# Patient Record
Sex: FEMALE | Race: BLACK OR AFRICAN AMERICAN | NOT HISPANIC OR LATINO | URBAN - METROPOLITAN AREA
[De-identification: names, ages, dates, MRNs, and addresses within clinical notes are randomized per-mention and may not be internally consistent; named-entity substitution may affect disease eponyms.]

---

## 2017-06-08 ENCOUNTER — INPATIENT (INPATIENT)
Facility: HOSPITAL | Age: 37
LOS: 1 days | Discharge: HOME | End: 2017-06-10
Attending: SPECIALIST | Admitting: SPECIALIST
Payer: COMMERCIAL

## 2017-06-08 VITALS — WEIGHT: 189.6 LBS | HEIGHT: 65 IN

## 2017-06-08 DIAGNOSIS — O26.899 OTHER SPECIFIED PREGNANCY RELATED CONDITIONS, UNSPECIFIED TRIMESTER: ICD-10-CM

## 2017-06-08 DIAGNOSIS — Z3A.00 WEEKS OF GESTATION OF PREGNANCY NOT SPECIFIED: ICD-10-CM

## 2017-06-08 LAB
BASOPHILS NFR BLD AUTO: 0.2 % — SIGNIFICANT CHANGE UP (ref 0–2)
BLD GP AB SCN SERPL QL: NEGATIVE — SIGNIFICANT CHANGE UP
EOSINOPHIL NFR BLD AUTO: 1.6 % — SIGNIFICANT CHANGE UP (ref 0–6)
HCT VFR BLD CALC: 39.8 % — SIGNIFICANT CHANGE UP (ref 34.5–45)
HGB BLD-MCNC: 14 G/DL — SIGNIFICANT CHANGE UP (ref 11.5–15.5)
LYMPHOCYTES # BLD AUTO: 24.6 % — SIGNIFICANT CHANGE UP (ref 13–44)
MCHC RBC-ENTMCNC: 30.6 PG — SIGNIFICANT CHANGE UP (ref 27–34)
MCHC RBC-ENTMCNC: 35.2 G/DL — SIGNIFICANT CHANGE UP (ref 32–36)
MCV RBC AUTO: 87.1 FL — SIGNIFICANT CHANGE UP (ref 80–100)
MONOCYTES NFR BLD AUTO: 7.7 % — SIGNIFICANT CHANGE UP (ref 2–14)
NEUTROPHILS NFR BLD AUTO: 65.9 % — SIGNIFICANT CHANGE UP (ref 43–77)
PLATELET # BLD AUTO: 188 K/UL — SIGNIFICANT CHANGE UP (ref 150–400)
RBC # BLD: 4.57 M/UL — SIGNIFICANT CHANGE UP (ref 3.8–5.2)
RBC # FLD: 14.1 % — SIGNIFICANT CHANGE UP (ref 10.3–16.9)
RH IG SCN BLD-IMP: POSITIVE — SIGNIFICANT CHANGE UP
RH IG SCN BLD-IMP: POSITIVE — SIGNIFICANT CHANGE UP
T PALLIDUM AB TITR SER: NEGATIVE — SIGNIFICANT CHANGE UP
WBC # BLD: 4.9 K/UL — SIGNIFICANT CHANGE UP (ref 3.8–10.5)
WBC # FLD AUTO: 4.9 K/UL — SIGNIFICANT CHANGE UP (ref 3.8–10.5)

## 2017-06-08 RX ORDER — ACETAMINOPHEN 500 MG
650 TABLET ORAL EVERY 6 HOURS
Qty: 0 | Refills: 0 | Status: DISCONTINUED | OUTPATIENT
Start: 2017-06-08 | End: 2017-06-10

## 2017-06-08 RX ORDER — SIMETHICONE 80 MG/1
80 TABLET, CHEWABLE ORAL EVERY 6 HOURS
Qty: 0 | Refills: 0 | Status: DISCONTINUED | OUTPATIENT
Start: 2017-06-08 | End: 2017-06-10

## 2017-06-08 RX ORDER — PRAMOXINE HYDROCHLORIDE 150 MG/15G
1 AEROSOL, FOAM RECTAL EVERY 4 HOURS
Qty: 0 | Refills: 0 | Status: DISCONTINUED | OUTPATIENT
Start: 2017-06-08 | End: 2017-06-10

## 2017-06-08 RX ORDER — SODIUM CHLORIDE 9 MG/ML
1000 INJECTION, SOLUTION INTRAVENOUS
Qty: 0 | Refills: 0 | Status: DISCONTINUED | OUTPATIENT
Start: 2017-06-08 | End: 2017-06-08

## 2017-06-08 RX ORDER — MAGNESIUM HYDROXIDE 400 MG/1
30 TABLET, CHEWABLE ORAL
Qty: 0 | Refills: 0 | Status: DISCONTINUED | OUTPATIENT
Start: 2017-06-08 | End: 2017-06-10

## 2017-06-08 RX ORDER — TETANUS TOXOID, REDUCED DIPHTHERIA TOXOID AND ACELLULAR PERTUSSIS VACCINE, ADSORBED 5; 2.5; 8; 8; 2.5 [IU]/.5ML; [IU]/.5ML; UG/.5ML; UG/.5ML; UG/.5ML
0.5 SUSPENSION INTRAMUSCULAR ONCE
Qty: 0 | Refills: 0 | Status: DISCONTINUED | OUTPATIENT
Start: 2017-06-08 | End: 2017-06-10

## 2017-06-08 RX ORDER — DOCUSATE SODIUM 100 MG
100 CAPSULE ORAL
Qty: 0 | Refills: 0 | Status: DISCONTINUED | OUTPATIENT
Start: 2017-06-08 | End: 2017-06-10

## 2017-06-08 RX ORDER — OXYTOCIN 10 UNIT/ML
41.67 VIAL (ML) INJECTION
Qty: 20 | Refills: 0 | Status: DISCONTINUED | OUTPATIENT
Start: 2017-06-08 | End: 2017-06-10

## 2017-06-08 RX ORDER — IBUPROFEN 200 MG
600 TABLET ORAL EVERY 6 HOURS
Qty: 0 | Refills: 0 | Status: DISCONTINUED | OUTPATIENT
Start: 2017-06-08 | End: 2017-06-10

## 2017-06-08 RX ORDER — CITRIC ACID/SODIUM CITRATE 300-500 MG
15 SOLUTION, ORAL ORAL EVERY 4 HOURS
Qty: 0 | Refills: 0 | Status: DISCONTINUED | OUTPATIENT
Start: 2017-06-08 | End: 2017-06-08

## 2017-06-08 RX ORDER — HYDROCORTISONE 1 %
1 OINTMENT (GRAM) TOPICAL EVERY 4 HOURS
Qty: 0 | Refills: 0 | Status: DISCONTINUED | OUTPATIENT
Start: 2017-06-08 | End: 2017-06-10

## 2017-06-08 RX ORDER — SODIUM CHLORIDE 9 MG/ML
3 INJECTION INTRAMUSCULAR; INTRAVENOUS; SUBCUTANEOUS EVERY 8 HOURS
Qty: 0 | Refills: 0 | Status: DISCONTINUED | OUTPATIENT
Start: 2017-06-08 | End: 2017-06-10

## 2017-06-08 RX ORDER — DIBUCAINE 1 %
1 OINTMENT (GRAM) RECTAL EVERY 4 HOURS
Qty: 0 | Refills: 0 | Status: DISCONTINUED | OUTPATIENT
Start: 2017-06-08 | End: 2017-06-10

## 2017-06-08 RX ORDER — SODIUM CHLORIDE 9 MG/ML
1000 INJECTION, SOLUTION INTRAVENOUS ONCE
Qty: 0 | Refills: 0 | Status: COMPLETED | OUTPATIENT
Start: 2017-06-08 | End: 2017-06-08

## 2017-06-08 RX ORDER — LANOLIN
1 OINTMENT (GRAM) TOPICAL EVERY 6 HOURS
Qty: 0 | Refills: 0 | Status: DISCONTINUED | OUTPATIENT
Start: 2017-06-08 | End: 2017-06-10

## 2017-06-08 RX ORDER — DIPHENHYDRAMINE HCL 50 MG
25 CAPSULE ORAL EVERY 6 HOURS
Qty: 0 | Refills: 0 | Status: DISCONTINUED | OUTPATIENT
Start: 2017-06-08 | End: 2017-06-10

## 2017-06-08 RX ORDER — OXYTOCIN 10 UNIT/ML
333.33 VIAL (ML) INJECTION
Qty: 20 | Refills: 0 | Status: DISCONTINUED | OUTPATIENT
Start: 2017-06-08 | End: 2017-06-08

## 2017-06-08 RX ORDER — AER TRAVELER 0.5 G/1
1 SOLUTION RECTAL; TOPICAL EVERY 4 HOURS
Qty: 0 | Refills: 0 | Status: DISCONTINUED | OUTPATIENT
Start: 2017-06-08 | End: 2017-06-10

## 2017-06-08 RX ORDER — GLYCERIN ADULT
1 SUPPOSITORY, RECTAL RECTAL AT BEDTIME
Qty: 0 | Refills: 0 | Status: DISCONTINUED | OUTPATIENT
Start: 2017-06-08 | End: 2017-06-10

## 2017-06-08 RX ADMIN — SODIUM CHLORIDE 125 MILLILITER(S): 9 INJECTION, SOLUTION INTRAVENOUS at 12:13

## 2017-06-08 RX ADMIN — SODIUM CHLORIDE 2000 MILLILITER(S): 9 INJECTION, SOLUTION INTRAVENOUS at 12:12

## 2017-06-09 RX ADMIN — Medication 1 APPLICATION(S): at 21:53

## 2017-06-09 RX ADMIN — Medication 1 TABLET(S): at 09:07

## 2017-06-09 RX ADMIN — Medication 600 MILLIGRAM(S): at 16:20

## 2017-06-09 RX ADMIN — SODIUM CHLORIDE 3 MILLILITER(S): 9 INJECTION INTRAMUSCULAR; INTRAVENOUS; SUBCUTANEOUS at 05:16

## 2017-06-09 RX ADMIN — Medication 600 MILLIGRAM(S): at 09:08

## 2017-06-09 RX ADMIN — Medication 600 MILLIGRAM(S): at 09:40

## 2017-06-09 RX ADMIN — Medication 100 MILLIGRAM(S): at 21:53

## 2017-06-09 RX ADMIN — SODIUM CHLORIDE 3 MILLILITER(S): 9 INJECTION INTRAMUSCULAR; INTRAVENOUS; SUBCUTANEOUS at 02:51

## 2017-06-09 RX ADMIN — Medication 600 MILLIGRAM(S): at 21:53

## 2017-06-09 RX ADMIN — Medication 600 MILLIGRAM(S): at 15:45

## 2017-06-09 RX ADMIN — SODIUM CHLORIDE 3 MILLILITER(S): 9 INJECTION INTRAMUSCULAR; INTRAVENOUS; SUBCUTANEOUS at 15:45

## 2017-06-09 NOTE — PROGRESS NOTE ADULT - SUBJECTIVE AND OBJECTIVE BOX
Patient evaluated at bedside.   She reports pain is well controlled.  She has been ambulating without assistance, voiding spontaneously, and is breastfeeding.  She has no concerns at this time.    Physical Exam:  T(C): 36.8, Max: 36.8 (06-09 @ 05:42)  HR: 91 (84 - 91)  BP: 108/72 (108/72 - 121/57)  RR: 18 (18 - 18)  SpO2: 97% (97% - 98%)  Wt(kg): --    GA: NAD, A+0 x 3  Abd: soft, nontender, nondistended, no rebound or guarding, uterus firm at midline, below the umbilicus  : lochia WNL  Extremities: no calf tenderness                            14.0   4.9   )-----------( 188      ( 08 Jun 2017 11:34 )             39.8

## 2017-06-09 NOTE — PROGRESS NOTE ADULT - ASSESSMENT
A/P 37y PPD#1 s/p , stable  1. Pain: well controlled on OPM  2. GI: Regular diet  3. : voiding  4. DVT prophylaxis: ambulate  5. Dispo: PPD 2, unless otherwise specified

## 2017-06-10 VITALS
SYSTOLIC BLOOD PRESSURE: 117 MMHG | TEMPERATURE: 98 F | DIASTOLIC BLOOD PRESSURE: 73 MMHG | OXYGEN SATURATION: 99 % | RESPIRATION RATE: 18 BRPM | HEART RATE: 102 BPM

## 2017-06-10 PROCEDURE — 86900 BLOOD TYPING SEROLOGIC ABO: CPT

## 2017-06-10 PROCEDURE — 86850 RBC ANTIBODY SCREEN: CPT

## 2017-06-10 PROCEDURE — 85025 COMPLETE CBC W/AUTO DIFF WBC: CPT

## 2017-06-10 PROCEDURE — 86901 BLOOD TYPING SEROLOGIC RH(D): CPT

## 2017-06-10 PROCEDURE — 99214 OFFICE O/P EST MOD 30 MIN: CPT

## 2017-06-10 PROCEDURE — 86780 TREPONEMA PALLIDUM: CPT

## 2017-06-10 RX ORDER — VALACYCLOVIR 500 MG/1
1 TABLET, FILM COATED ORAL
Qty: 0 | Refills: 0 | COMMUNITY

## 2017-06-10 RX ADMIN — Medication 600 MILLIGRAM(S): at 05:02

## 2017-06-10 RX ADMIN — Medication 600 MILLIGRAM(S): at 11:08

## 2017-06-10 RX ADMIN — Medication 600 MILLIGRAM(S): at 04:02

## 2017-06-10 RX ADMIN — Medication 600 MILLIGRAM(S): at 10:09

## 2017-06-10 NOTE — PROGRESS NOTE ADULT - SUBJECTIVE AND OBJECTIVE BOX
Patient evaluated at bedside.   She reports pain is well controlled.  She has been ambulating without assistance, voiding spontaneously, and is breastfeeding.  She has no concerns at this time.    Physical Exam:  Vital Signs Last 24 Hrs  T(C): 36.9, Max: 36.9 (06-09 @ 21:00)  T(F): 98.5, Max: 98.5 (06-09 @ 21:00)  HR: 85 (77 - 85)  BP: 110/69 (106/70 - 110/69)  BP(mean): --  RR: 18 (18 - 18)  SpO2: 97% (97% - 98%)    GA: NAD, A+0 x 3  Abd: soft, nontender, nondistended, no rebound or guarding, uterus firm at midline, below the umbilicus  : lochia WNL  Extremities: no calf tenderness                            14.0   4.9   )-----------( 188      ( 08 Jun 2017 11:34 )             39.8

## 2017-06-10 NOTE — DISCHARGE NOTE OB - CARE PROVIDER_API CALL
Aniket Lagunas), Obstetrics and Gynecology  [address] [place] [address]  Phone: [phone]  Fax: [phone]

## 2017-06-10 NOTE — DISCHARGE NOTE OB - CARE PLAN
Principal Discharge DX:	Postpartum normal course  Goal:	healthy recovery!  Instructions for follow-up, activity and diet:	no sex 6 weeks. no baths 6 weeks. shower is ok. return to the hospital for fever >101, abdominal pain, heavy vaginal bleeding.

## 2017-06-10 NOTE — DISCHARGE NOTE OB - PATIENT PORTAL LINK FT
“You can access the [facility] Patient Portal, offered by [facility], by registering with the following website: [email or web address]”

## 2017-06-10 NOTE — DISCHARGE NOTE OB - PLAN OF CARE
healthy recovery! no sex 6 weeks. no baths 6 weeks. shower is ok. return to the hospital for fever >101, abdominal pain, heavy vaginal bleeding.

## 2017-06-13 DIAGNOSIS — Z34.83 ENCOUNTER FOR SUPERVISION OF OTHER NORMAL PREGNANCY, THIRD TRIMESTER: ICD-10-CM

## 2017-06-13 DIAGNOSIS — O34.13 MATERNAL CARE FOR BENIGN TUMOR OF CORPUS UTERI, THIRD TRIMESTER: ICD-10-CM

## 2017-06-13 DIAGNOSIS — O26.899 OTHER SPECIFIED PREGNANCY RELATED CONDITIONS, UNSPECIFIED TRIMESTER: ICD-10-CM

## 2017-06-13 DIAGNOSIS — D25.9 LEIOMYOMA OF UTERUS, UNSPECIFIED: ICD-10-CM

## 2017-06-13 DIAGNOSIS — O09.523 SUPERVISION OF ELDERLY MULTIGRAVIDA, THIRD TRIMESTER: ICD-10-CM

## 2017-06-13 DIAGNOSIS — Z3A.00 WEEKS OF GESTATION OF PREGNANCY NOT SPECIFIED: ICD-10-CM

## 2017-06-13 DIAGNOSIS — Z3A.39 39 WEEKS GESTATION OF PREGNANCY: ICD-10-CM

## 2018-12-11 PROBLEM — Z00.00 ENCOUNTER FOR PREVENTIVE HEALTH EXAMINATION: Status: ACTIVE | Noted: 2018-12-11

## 2019-01-18 ENCOUNTER — OUTPATIENT (OUTPATIENT)
Dept: OUTPATIENT SERVICES | Facility: HOSPITAL | Age: 39
LOS: 1 days | End: 2019-01-18
Payer: COMMERCIAL

## 2019-01-18 ENCOUNTER — APPOINTMENT (OUTPATIENT)
Dept: ULTRASOUND IMAGING | Facility: HOSPITAL | Age: 39
End: 2019-01-18
Payer: COMMERCIAL

## 2019-01-18 ENCOUNTER — APPOINTMENT (OUTPATIENT)
Dept: MAMMOGRAPHY | Facility: HOSPITAL | Age: 39
End: 2019-01-18
Payer: COMMERCIAL

## 2019-01-18 PROCEDURE — 76856 US EXAM PELVIC COMPLETE: CPT | Mod: 26

## 2019-01-18 PROCEDURE — 77067 SCR MAMMO BI INCL CAD: CPT | Mod: 26

## 2019-01-18 PROCEDURE — 77067 SCR MAMMO BI INCL CAD: CPT

## 2019-01-18 PROCEDURE — 76856 US EXAM PELVIC COMPLETE: CPT

## 2019-01-18 PROCEDURE — 77063 BREAST TOMOSYNTHESIS BI: CPT

## 2019-01-18 PROCEDURE — 76830 TRANSVAGINAL US NON-OB: CPT | Mod: 26

## 2019-01-18 PROCEDURE — 76830 TRANSVAGINAL US NON-OB: CPT

## 2019-01-18 PROCEDURE — 77063 BREAST TOMOSYNTHESIS BI: CPT | Mod: 26

## 2019-04-08 ENCOUNTER — INPATIENT (INPATIENT)
Facility: HOSPITAL | Age: 39
LOS: 1 days | Discharge: HOME | DRG: 743 | End: 2019-04-10
Attending: OBSTETRICS & GYNECOLOGY | Admitting: OBSTETRICS & GYNECOLOGY
Payer: COMMERCIAL

## 2019-04-08 ENCOUNTER — RESULT REVIEW (OUTPATIENT)
Age: 39
End: 2019-04-08

## 2019-04-08 VITALS
DIASTOLIC BLOOD PRESSURE: 68 MMHG | TEMPERATURE: 98 F | HEIGHT: 65 IN | RESPIRATION RATE: 16 BRPM | OXYGEN SATURATION: 100 % | HEART RATE: 78 BPM | SYSTOLIC BLOOD PRESSURE: 113 MMHG | WEIGHT: 158.95 LBS

## 2019-04-08 DIAGNOSIS — D25.0 SUBMUCOUS LEIOMYOMA OF UTERUS: ICD-10-CM

## 2019-04-08 DIAGNOSIS — Z98.890 OTHER SPECIFIED POSTPROCEDURAL STATES: Chronic | ICD-10-CM

## 2019-04-08 DIAGNOSIS — D25.1 INTRAMURAL LEIOMYOMA OF UTERUS: ICD-10-CM

## 2019-04-08 DIAGNOSIS — D25.2 SUBSEROSAL LEIOMYOMA OF UTERUS: ICD-10-CM

## 2019-04-08 DIAGNOSIS — D25.9 LEIOMYOMA OF UTERUS, UNSPECIFIED: ICD-10-CM

## 2019-04-08 LAB
ALBUMIN SERPL ELPH-MCNC: 4.4 G/DL — SIGNIFICANT CHANGE UP (ref 3.3–5)
ALP SERPL-CCNC: 50 U/L — SIGNIFICANT CHANGE UP (ref 40–120)
ALT FLD-CCNC: 10 U/L — SIGNIFICANT CHANGE UP (ref 10–45)
ANION GAP SERPL CALC-SCNC: 12 MMOL/L — SIGNIFICANT CHANGE UP (ref 5–17)
APTT BLD: 31.5 SEC — SIGNIFICANT CHANGE UP (ref 27.5–36.3)
AST SERPL-CCNC: 16 U/L — SIGNIFICANT CHANGE UP (ref 10–40)
BASOPHILS # BLD AUTO: 0.09 K/UL — SIGNIFICANT CHANGE UP (ref 0–0.2)
BASOPHILS NFR BLD AUTO: 2.3 % — HIGH (ref 0–2)
BILIRUB SERPL-MCNC: 0.2 MG/DL — SIGNIFICANT CHANGE UP (ref 0.2–1.2)
BUN SERPL-MCNC: 11 MG/DL — SIGNIFICANT CHANGE UP (ref 7–23)
CALCIUM SERPL-MCNC: 9.1 MG/DL — SIGNIFICANT CHANGE UP (ref 8.4–10.5)
CHLORIDE SERPL-SCNC: 109 MMOL/L — HIGH (ref 96–108)
CO2 SERPL-SCNC: 20 MMOL/L — LOW (ref 22–31)
CREAT SERPL-MCNC: 0.83 MG/DL — SIGNIFICANT CHANGE UP (ref 0.5–1.3)
EOSINOPHIL # BLD AUTO: 0.2 K/UL — SIGNIFICANT CHANGE UP (ref 0–0.5)
EOSINOPHIL NFR BLD AUTO: 5.2 % — SIGNIFICANT CHANGE UP (ref 0–6)
EXTRA SST TUBE: SIGNIFICANT CHANGE UP
GLUCOSE SERPL-MCNC: 93 MG/DL — SIGNIFICANT CHANGE UP (ref 70–99)
HCT VFR BLD CALC: 34.3 % — LOW (ref 34.5–45)
HGB BLD-MCNC: 10.3 G/DL — LOW (ref 11.5–15.5)
IMM GRANULOCYTES NFR BLD AUTO: 0.3 % — SIGNIFICANT CHANGE UP (ref 0–1.5)
INR BLD: 1.01 — SIGNIFICANT CHANGE UP (ref 0.88–1.16)
LYMPHOCYTES # BLD AUTO: 1.28 K/UL — SIGNIFICANT CHANGE UP (ref 1–3.3)
LYMPHOCYTES # BLD AUTO: 33.3 % — SIGNIFICANT CHANGE UP (ref 13–44)
MCHC RBC-ENTMCNC: 22.8 PG — LOW (ref 27–34)
MCHC RBC-ENTMCNC: 30 GM/DL — LOW (ref 32–36)
MCV RBC AUTO: 76.1 FL — LOW (ref 80–100)
MONOCYTES # BLD AUTO: 0.28 K/UL — SIGNIFICANT CHANGE UP (ref 0–0.9)
MONOCYTES NFR BLD AUTO: 7.3 % — SIGNIFICANT CHANGE UP (ref 2–14)
NEUTROPHILS # BLD AUTO: 1.98 K/UL — SIGNIFICANT CHANGE UP (ref 1.8–7.4)
NEUTROPHILS NFR BLD AUTO: 51.6 % — SIGNIFICANT CHANGE UP (ref 43–77)
NRBC # BLD: 0 /100 WBCS — SIGNIFICANT CHANGE UP (ref 0–0)
PLATELET # BLD AUTO: 315 K/UL — SIGNIFICANT CHANGE UP (ref 150–400)
POTASSIUM SERPL-MCNC: 3.9 MMOL/L — SIGNIFICANT CHANGE UP (ref 3.5–5.3)
POTASSIUM SERPL-SCNC: 3.9 MMOL/L — SIGNIFICANT CHANGE UP (ref 3.5–5.3)
PROT SERPL-MCNC: 7.7 G/DL — SIGNIFICANT CHANGE UP (ref 6–8.3)
PROTHROM AB SERPL-ACNC: 11.4 SEC — SIGNIFICANT CHANGE UP (ref 10–12.9)
RBC # BLD: 4.51 M/UL — SIGNIFICANT CHANGE UP (ref 3.8–5.2)
RBC # FLD: 19.4 % — HIGH (ref 10.3–14.5)
SODIUM SERPL-SCNC: 141 MMOL/L — SIGNIFICANT CHANGE UP (ref 135–145)
WBC # BLD: 3.84 K/UL — SIGNIFICANT CHANGE UP (ref 3.8–10.5)
WBC # FLD AUTO: 3.84 K/UL — SIGNIFICANT CHANGE UP (ref 3.8–10.5)

## 2019-04-08 PROCEDURE — 58146 MYOMECTOMY ABDOM COMPLEX: CPT

## 2019-04-08 RX ORDER — HYDROMORPHONE HYDROCHLORIDE 2 MG/ML
0.5 INJECTION INTRAMUSCULAR; INTRAVENOUS; SUBCUTANEOUS
Qty: 0 | Refills: 0 | Status: DISCONTINUED | OUTPATIENT
Start: 2019-04-08 | End: 2019-04-09

## 2019-04-08 RX ORDER — ONDANSETRON 8 MG/1
4 TABLET, FILM COATED ORAL EVERY 6 HOURS
Qty: 0 | Refills: 0 | Status: DISCONTINUED | OUTPATIENT
Start: 2019-04-08 | End: 2019-04-10

## 2019-04-08 RX ORDER — NALOXONE HYDROCHLORIDE 4 MG/.1ML
0.1 SPRAY NASAL
Qty: 0 | Refills: 0 | Status: DISCONTINUED | OUTPATIENT
Start: 2019-04-08 | End: 2019-04-10

## 2019-04-08 RX ORDER — ONDANSETRON 8 MG/1
4 TABLET, FILM COATED ORAL EVERY 6 HOURS
Qty: 0 | Refills: 0 | Status: DISCONTINUED | OUTPATIENT
Start: 2019-04-08 | End: 2019-04-08

## 2019-04-08 RX ORDER — BENZOCAINE AND MENTHOL 5; 1 G/100ML; G/100ML
1 LIQUID ORAL EVERY 4 HOURS
Qty: 0 | Refills: 0 | Status: DISCONTINUED | OUTPATIENT
Start: 2019-04-08 | End: 2019-04-10

## 2019-04-08 RX ORDER — METOCLOPRAMIDE HCL 10 MG
10 TABLET ORAL EVERY 6 HOURS
Qty: 0 | Refills: 0 | Status: DISCONTINUED | OUTPATIENT
Start: 2019-04-08 | End: 2019-04-10

## 2019-04-08 RX ORDER — KETOROLAC TROMETHAMINE 30 MG/ML
30 SYRINGE (ML) INJECTION EVERY 6 HOURS
Qty: 0 | Refills: 0 | Status: DISCONTINUED | OUTPATIENT
Start: 2019-04-08 | End: 2019-04-10

## 2019-04-08 RX ORDER — DOCUSATE SODIUM 100 MG
100 CAPSULE ORAL THREE TIMES A DAY
Qty: 0 | Refills: 0 | Status: DISCONTINUED | OUTPATIENT
Start: 2019-04-08 | End: 2019-04-10

## 2019-04-08 RX ORDER — SODIUM CHLORIDE 9 MG/ML
1000 INJECTION, SOLUTION INTRAVENOUS
Qty: 0 | Refills: 0 | Status: DISCONTINUED | OUTPATIENT
Start: 2019-04-08 | End: 2019-04-09

## 2019-04-08 RX ORDER — ACETAMINOPHEN 500 MG
1000 TABLET ORAL EVERY 8 HOURS
Qty: 0 | Refills: 0 | Status: DISCONTINUED | OUTPATIENT
Start: 2019-04-08 | End: 2019-04-10

## 2019-04-08 RX ORDER — ENOXAPARIN SODIUM 100 MG/ML
40 INJECTION SUBCUTANEOUS EVERY 24 HOURS
Qty: 0 | Refills: 0 | Status: DISCONTINUED | OUTPATIENT
Start: 2019-04-08 | End: 2019-04-10

## 2019-04-08 RX ORDER — ENOXAPARIN SODIUM 100 MG/ML
40 INJECTION SUBCUTANEOUS EVERY 24 HOURS
Qty: 0 | Refills: 0 | Status: DISCONTINUED | OUTPATIENT
Start: 2019-04-08 | End: 2019-04-08

## 2019-04-08 RX ORDER — SIMETHICONE 80 MG/1
80 TABLET, CHEWABLE ORAL EVERY 8 HOURS
Qty: 0 | Refills: 0 | Status: DISCONTINUED | OUTPATIENT
Start: 2019-04-08 | End: 2019-04-10

## 2019-04-08 RX ORDER — HYDROMORPHONE HYDROCHLORIDE 2 MG/ML
30 INJECTION INTRAMUSCULAR; INTRAVENOUS; SUBCUTANEOUS
Qty: 0 | Refills: 0 | Status: DISCONTINUED | OUTPATIENT
Start: 2019-04-08 | End: 2019-04-09

## 2019-04-08 RX ADMIN — SIMETHICONE 80 MILLIGRAM(S): 80 TABLET, CHEWABLE ORAL at 21:09

## 2019-04-08 RX ADMIN — ENOXAPARIN SODIUM 40 MILLIGRAM(S): 100 INJECTION SUBCUTANEOUS at 21:08

## 2019-04-08 RX ADMIN — Medication 1000 MILLIGRAM(S): at 22:00

## 2019-04-08 RX ADMIN — Medication 1000 MILLIGRAM(S): at 21:09

## 2019-04-08 RX ADMIN — HYDROMORPHONE HYDROCHLORIDE 30 MILLILITER(S): 2 INJECTION INTRAMUSCULAR; INTRAVENOUS; SUBCUTANEOUS at 14:36

## 2019-04-08 RX ADMIN — HYDROMORPHONE HYDROCHLORIDE 0.5 MILLIGRAM(S): 2 INJECTION INTRAMUSCULAR; INTRAVENOUS; SUBCUTANEOUS at 14:17

## 2019-04-08 RX ADMIN — Medication 100 MILLIGRAM(S): at 21:09

## 2019-04-08 RX ADMIN — Medication 30 MILLIGRAM(S): at 18:23

## 2019-04-08 RX ADMIN — BENZOCAINE AND MENTHOL 1 LOZENGE: 5; 1 LIQUID ORAL at 22:52

## 2019-04-08 RX ADMIN — Medication 30 MILLIGRAM(S): at 18:24

## 2019-04-08 NOTE — H&P ADULT - ASSESSMENT
38yo P2 presenting for scheduled abdominal myomectomy  -NPO  -IVF  -cross 2 units PRBC  -plan for admission postop

## 2019-04-08 NOTE — ASU PREOP CHECKLIST - SELECT TESTS ORDERED
POC urine HCG/EKG EKG/POC urine HCG negative, CBC, CMP, coags, type and screen sent  this visit/Type and Screen/PT/PTT/INR/CBC/CMP

## 2019-04-08 NOTE — H&P ADULT - HISTORY OF PRESENT ILLNESS
38yo P2 presenting for scheduled abdominal myomectomy. Patient reports year long history of heavy menstrual bleeding and pelvic pain. Reports regular cycles with heavy bleeding for 8 days. Goes through multiple ultra absorbant pads a day. Reports pelvic pain that is severe and debilitating during periods. Has never received a blood transfusion. Recently started on PO iron but has not been taking it. Denies lightheadedness, dizziness, shortness of breath.     OBHx   x2 '05 and '17 uncomplicated    GYNHx: menstrual history as above; denies abnormal paps or STDs  PMH: none  PSH: none  Meds: none  All: NKDA

## 2019-04-08 NOTE — H&P ADULT - NSHPPHYSICALEXAM_GEN_ALL_CORE
Vital Signs Last 24 Hrs  T(C): 36.7 (08 Apr 2019 10:21), Max: 36.7 (08 Apr 2019 10:21)  T(F): 98 (08 Apr 2019 10:21), Max: 98 (08 Apr 2019 10:21)  HR: 78 (08 Apr 2019 10:21) (78 - 78)  BP: 113/68 (08 Apr 2019 10:21) (113/68 - 113/68)  BP(mean): --  RR: 16 (08 Apr 2019 10:21) (16 - 16)  SpO2: 100% (08 Apr 2019 10:21) (100% - 100%)    Gen: in no acute distress  Abd: fundus palpated at pubic symphysis; mobile, nontender

## 2019-04-08 NOTE — BRIEF OPERATIVE NOTE - OPERATION/FINDINGS
14cm fibroid uterus; subserosal, intramural, and submucosal fibroids removed, 25 in total with largest of 5x5cm. Normal tubes and ovaries bilaterally.

## 2019-04-09 LAB
ANION GAP SERPL CALC-SCNC: 8 MMOL/L — SIGNIFICANT CHANGE UP (ref 5–17)
BASOPHILS # BLD AUTO: 0 K/UL — SIGNIFICANT CHANGE UP (ref 0–0.2)
BASOPHILS NFR BLD AUTO: 0 % — SIGNIFICANT CHANGE UP (ref 0–2)
BUN SERPL-MCNC: 7 MG/DL — SIGNIFICANT CHANGE UP (ref 7–23)
CALCIUM SERPL-MCNC: 8.3 MG/DL — LOW (ref 8.4–10.5)
CHLORIDE SERPL-SCNC: 106 MMOL/L — SIGNIFICANT CHANGE UP (ref 96–108)
CO2 SERPL-SCNC: 22 MMOL/L — SIGNIFICANT CHANGE UP (ref 22–31)
CREAT SERPL-MCNC: 0.68 MG/DL — SIGNIFICANT CHANGE UP (ref 0.5–1.3)
EOSINOPHIL # BLD AUTO: 0 K/UL — SIGNIFICANT CHANGE UP (ref 0–0.5)
EOSINOPHIL NFR BLD AUTO: 0 % — SIGNIFICANT CHANGE UP (ref 0–6)
GLUCOSE SERPL-MCNC: 128 MG/DL — HIGH (ref 70–99)
HCT VFR BLD CALC: 26.9 % — LOW (ref 34.5–45)
HGB BLD-MCNC: 7.9 G/DL — LOW (ref 11.5–15.5)
IMM GRANULOCYTES NFR BLD AUTO: 0.5 % — SIGNIFICANT CHANGE UP (ref 0–1.5)
LYMPHOCYTES # BLD AUTO: 0.85 K/UL — LOW (ref 1–3.3)
LYMPHOCYTES # BLD AUTO: 8.3 % — LOW (ref 13–44)
MCHC RBC-ENTMCNC: 22.7 PG — LOW (ref 27–34)
MCHC RBC-ENTMCNC: 29.4 GM/DL — LOW (ref 32–36)
MCV RBC AUTO: 77.3 FL — LOW (ref 80–100)
MONOCYTES # BLD AUTO: 0.54 K/UL — SIGNIFICANT CHANGE UP (ref 0–0.9)
MONOCYTES NFR BLD AUTO: 5.3 % — SIGNIFICANT CHANGE UP (ref 2–14)
NEUTROPHILS # BLD AUTO: 8.76 K/UL — HIGH (ref 1.8–7.4)
NEUTROPHILS NFR BLD AUTO: 85.9 % — HIGH (ref 43–77)
NRBC # BLD: 0 /100 WBCS — SIGNIFICANT CHANGE UP (ref 0–0)
PLATELET # BLD AUTO: 239 K/UL — SIGNIFICANT CHANGE UP (ref 150–400)
POTASSIUM SERPL-MCNC: 3.9 MMOL/L — SIGNIFICANT CHANGE UP (ref 3.5–5.3)
POTASSIUM SERPL-SCNC: 3.9 MMOL/L — SIGNIFICANT CHANGE UP (ref 3.5–5.3)
RBC # BLD: 3.48 M/UL — LOW (ref 3.8–5.2)
RBC # FLD: 19.4 % — HIGH (ref 10.3–14.5)
SODIUM SERPL-SCNC: 136 MMOL/L — SIGNIFICANT CHANGE UP (ref 135–145)
WBC # BLD: 10.2 K/UL — SIGNIFICANT CHANGE UP (ref 3.8–10.5)
WBC # FLD AUTO: 10.2 K/UL — SIGNIFICANT CHANGE UP (ref 3.8–10.5)

## 2019-04-09 RX ORDER — OXYCODONE HYDROCHLORIDE 5 MG/1
10 TABLET ORAL EVERY 6 HOURS
Qty: 0 | Refills: 0 | Status: DISCONTINUED | OUTPATIENT
Start: 2019-04-09 | End: 2019-04-10

## 2019-04-09 RX ORDER — OXYCODONE HYDROCHLORIDE 5 MG/1
5 TABLET ORAL EVERY 4 HOURS
Qty: 0 | Refills: 0 | Status: DISCONTINUED | OUTPATIENT
Start: 2019-04-09 | End: 2019-04-10

## 2019-04-09 RX ADMIN — Medication 100 MILLIGRAM(S): at 13:26

## 2019-04-09 RX ADMIN — Medication 1000 MILLIGRAM(S): at 22:00

## 2019-04-09 RX ADMIN — Medication 1000 MILLIGRAM(S): at 13:26

## 2019-04-09 RX ADMIN — Medication 30 MILLIGRAM(S): at 17:25

## 2019-04-09 RX ADMIN — Medication 30 MILLIGRAM(S): at 00:45

## 2019-04-09 RX ADMIN — SIMETHICONE 80 MILLIGRAM(S): 80 TABLET, CHEWABLE ORAL at 13:26

## 2019-04-09 RX ADMIN — Medication 30 MILLIGRAM(S): at 06:15

## 2019-04-09 RX ADMIN — Medication 30 MILLIGRAM(S): at 11:15

## 2019-04-09 RX ADMIN — Medication 1000 MILLIGRAM(S): at 21:11

## 2019-04-09 RX ADMIN — Medication 30 MILLIGRAM(S): at 11:30

## 2019-04-09 RX ADMIN — Medication 1000 MILLIGRAM(S): at 14:20

## 2019-04-09 RX ADMIN — SIMETHICONE 80 MILLIGRAM(S): 80 TABLET, CHEWABLE ORAL at 06:01

## 2019-04-09 RX ADMIN — Medication 30 MILLIGRAM(S): at 00:28

## 2019-04-09 RX ADMIN — SIMETHICONE 80 MILLIGRAM(S): 80 TABLET, CHEWABLE ORAL at 21:10

## 2019-04-09 RX ADMIN — Medication 30 MILLIGRAM(S): at 06:01

## 2019-04-09 RX ADMIN — ENOXAPARIN SODIUM 40 MILLIGRAM(S): 100 INJECTION SUBCUTANEOUS at 21:11

## 2019-04-09 RX ADMIN — Medication 100 MILLIGRAM(S): at 21:10

## 2019-04-09 RX ADMIN — Medication 10 MILLIGRAM(S): at 08:25

## 2019-04-09 RX ADMIN — SODIUM CHLORIDE 100 MILLILITER(S): 9 INJECTION, SOLUTION INTRAVENOUS at 02:25

## 2019-04-09 RX ADMIN — Medication 30 MILLIGRAM(S): at 17:10

## 2019-04-10 ENCOUNTER — TRANSCRIPTION ENCOUNTER (OUTPATIENT)
Age: 39
End: 2019-04-10

## 2019-04-10 VITALS
OXYGEN SATURATION: 99 % | TEMPERATURE: 99 F | DIASTOLIC BLOOD PRESSURE: 75 MMHG | SYSTOLIC BLOOD PRESSURE: 116 MMHG | HEART RATE: 85 BPM | RESPIRATION RATE: 16 BRPM

## 2019-04-10 LAB
BASOPHILS # BLD AUTO: 0.03 K/UL — SIGNIFICANT CHANGE UP (ref 0–0.2)
BASOPHILS NFR BLD AUTO: 0.5 % — SIGNIFICANT CHANGE UP (ref 0–2)
EOSINOPHIL # BLD AUTO: 0.08 K/UL — SIGNIFICANT CHANGE UP (ref 0–0.5)
EOSINOPHIL NFR BLD AUTO: 1.3 % — SIGNIFICANT CHANGE UP (ref 0–6)
HCT VFR BLD CALC: 24.3 % — LOW (ref 34.5–45)
HGB BLD-MCNC: 7.2 G/DL — LOW (ref 11.5–15.5)
IMM GRANULOCYTES NFR BLD AUTO: 0.3 % — SIGNIFICANT CHANGE UP (ref 0–1.5)
LYMPHOCYTES # BLD AUTO: 1.54 K/UL — SIGNIFICANT CHANGE UP (ref 1–3.3)
LYMPHOCYTES # BLD AUTO: 25.3 % — SIGNIFICANT CHANGE UP (ref 13–44)
MCHC RBC-ENTMCNC: 22.9 PG — LOW (ref 27–34)
MCHC RBC-ENTMCNC: 29.6 GM/DL — LOW (ref 32–36)
MCV RBC AUTO: 77.1 FL — LOW (ref 80–100)
MONOCYTES # BLD AUTO: 0.5 K/UL — SIGNIFICANT CHANGE UP (ref 0–0.9)
MONOCYTES NFR BLD AUTO: 8.2 % — SIGNIFICANT CHANGE UP (ref 2–14)
NEUTROPHILS # BLD AUTO: 3.92 K/UL — SIGNIFICANT CHANGE UP (ref 1.8–7.4)
NEUTROPHILS NFR BLD AUTO: 64.4 % — SIGNIFICANT CHANGE UP (ref 43–77)
NRBC # BLD: 0 /100 WBCS — SIGNIFICANT CHANGE UP (ref 0–0)
PLATELET # BLD AUTO: 223 K/UL — SIGNIFICANT CHANGE UP (ref 150–400)
RBC # BLD: 3.15 M/UL — LOW (ref 3.8–5.2)
RBC # FLD: 19.9 % — HIGH (ref 10.3–14.5)
WBC # BLD: 6.09 K/UL — SIGNIFICANT CHANGE UP (ref 3.8–10.5)
WBC # FLD AUTO: 6.09 K/UL — SIGNIFICANT CHANGE UP (ref 3.8–10.5)

## 2019-04-10 RX ADMIN — Medication 1000 MILLIGRAM(S): at 14:30

## 2019-04-10 RX ADMIN — Medication 100 MILLIGRAM(S): at 13:37

## 2019-04-10 RX ADMIN — SIMETHICONE 80 MILLIGRAM(S): 80 TABLET, CHEWABLE ORAL at 05:20

## 2019-04-10 RX ADMIN — Medication 30 MILLIGRAM(S): at 00:05

## 2019-04-10 RX ADMIN — OXYCODONE HYDROCHLORIDE 5 MILLIGRAM(S): 5 TABLET ORAL at 05:00

## 2019-04-10 RX ADMIN — Medication 30 MILLIGRAM(S): at 06:00

## 2019-04-10 RX ADMIN — OXYCODONE HYDROCHLORIDE 5 MILLIGRAM(S): 5 TABLET ORAL at 15:23

## 2019-04-10 RX ADMIN — Medication 1000 MILLIGRAM(S): at 05:21

## 2019-04-10 RX ADMIN — Medication 1000 MILLIGRAM(S): at 06:00

## 2019-04-10 RX ADMIN — SIMETHICONE 80 MILLIGRAM(S): 80 TABLET, CHEWABLE ORAL at 13:37

## 2019-04-10 RX ADMIN — Medication 30 MILLIGRAM(S): at 11:15

## 2019-04-10 RX ADMIN — OXYCODONE HYDROCHLORIDE 5 MILLIGRAM(S): 5 TABLET ORAL at 15:53

## 2019-04-10 RX ADMIN — Medication 100 MILLIGRAM(S): at 05:21

## 2019-04-10 RX ADMIN — Medication 1000 MILLIGRAM(S): at 13:37

## 2019-04-10 RX ADMIN — Medication 30 MILLIGRAM(S): at 05:21

## 2019-04-10 RX ADMIN — Medication 30 MILLIGRAM(S): at 11:30

## 2019-04-10 RX ADMIN — OXYCODONE HYDROCHLORIDE 5 MILLIGRAM(S): 5 TABLET ORAL at 04:09

## 2019-04-10 RX ADMIN — Medication 30 MILLIGRAM(S): at 00:30

## 2019-04-10 NOTE — DISCHARGE NOTE PROVIDER - CARE PROVIDER_API CALL
Rand Mo)  Obstetrics and Gynecology  [address], [facility] [place] [address]  Phone: [phone]  Fax: [phone]  Follow Up Time:

## 2019-04-10 NOTE — PROGRESS NOTE ADULT - ASSESSMENT
39y Female POD#1 s/p abdominal myomectomy                                        1. Neuro/Pain:  OPM, toradol  2  CV:   VS per routine  3. Pulm: Encourage ISS  4. GI: Reg, monitor for flatus  5. :  Voiding  6. Heme: f/u AM CBC  7. ID: --  8. DVT ppx: SCDs, lovenox 40mg qd  9. Dispo: Likely POD#2 or 3
39y Female POD# 0 s/p abdominal myomectomy                                        1. Neuro/Pain:  PCA  2  CV:   VS per routine  3. Pulm: Encourage ISS  4. GI: clears, Advance with flatus  5. :  Patel in place, TOV in AM  6. Heme: AM CBC  7. ID: --  8. DVT ppx: SCDs, lovenox 40mg qd  9. Dispo: Likely POD#2 or 3
39y Female POD#1 s/p abdominal myomectomy    1. Neuro/Pain:  OPM, toradol  2  CV:   VS per routine  3. Pulm: Encourage ISS  4. GI: Reg  5. :  Voiding  6. Heme: Hb 7.9 this morning; asymptomatic    7. ID: --  8. DVT ppx: SCDs, lovenox  9. Dispo: Likely POD#2
A/P: POD2 abdominal myomectomy   1. FEN - reg  2. PAIN - opm  3.  - voiding  4. RESP -  IS  5. VTE prophylaxis - lovenox  6. LABS - no more needed  7. DISPO - dc home today
Stable at POD 1 s/p exploratory lap and abdominal myomectomy.  Continue regular diet as tolerated, analgesia PRN and supportive care.  Likely discharge home in the AM.

## 2019-04-10 NOTE — DISCHARGE NOTE PROVIDER - NSDCFUADDINST_GEN_ALL_CORE_FT
-return to the ER if you have fever >101, severe abdominal pain, excessive vaginal bleeding  - the best medication for pain is Motrin 600mg every 6 hours for the first few days, then taper  - percocet has been sent IF NEEDED

## 2019-04-10 NOTE — PROGRESS NOTE ADULT - REASON FOR ADMISSION
scheduled abdominal myomectomy

## 2019-04-10 NOTE — PROGRESS NOTE ADULT - SUBJECTIVE AND OBJECTIVE BOX
GYN Progress Note    Patient seen at bedside.  Pain controlled on PCA pump.  Tolerating clears. No flatus  Has not been OOB  Patel in place.    Denies CP, palpitations, SOB, fever, chills, nausea, vomiting.    Vital Signs Last 24 Hrs  T(C): 37.2 (09 Apr 2019 05:43), Max: 37.2 (09 Apr 2019 05:43)  T(F): 99 (09 Apr 2019 05:43), Max: 99 (09 Apr 2019 05:43)  HR: 71 (09 Apr 2019 05:43) (68 - 78)  BP: 95/55 (09 Apr 2019 05:43) (91/56 - 113/70)  BP(mean): 76 (08 Apr 2019 15:36) (74 - 77)  RR: 16 (09 Apr 2019 05:43) (12 - 25)  SpO2: 98% (09 Apr 2019 05:43) (97% - 100%)    Physical Exam:  Gen: No Acute Distress  Pulm: Normal work of breathing  GI: soft, appropriately tender, nondistended, +BS, no rebound, no guarding.  Incision C/D/I  Ext: SCDs in place, wnl    I&O's Summary    08 Apr 2019 07:01  -  09 Apr 2019 06:30  --------------------------------------------------------  IN: 940 mL / OUT: 640 mL / NET: 300 mL      MEDICATIONS  (STANDING):  acetaminophen   Tablet .. 1000 milliGRAM(s) Oral every 8 hours  docusate sodium 100 milliGRAM(s) Oral three times a day  enoxaparin Injectable 40 milliGRAM(s) SubCutaneous every 24 hours  HYDROmorphone PCA (1 mG/mL) 30 milliLiter(s) PCA Continuous PCA Continuous  ketorolac   Injectable 30 milliGRAM(s) IV Push every 6 hours  simethicone 80 milliGRAM(s) Chew every 8 hours    MEDICATIONS  (PRN):  benzocaine 15 mG/menthol 3.6 mG Lozenge 1 Lozenge Oral every 4 hours PRN Sore Throat  HYDROmorphone  Injectable 0.5 milliGRAM(s) IV Push every 10 minutes PRN Severe Pain (7 - 10)  metoclopramide Injectable 10 milliGRAM(s) IV Push every 6 hours PRN Nausea and/or Vomiting  naloxone Injectable 0.1 milliGRAM(s) IV Push every 3 minutes PRN For ANY of the following changes in patient status:  A. RR LESS THAN 10 breaths per minute, B. Oxygen saturation LESS THAN 90%, C. Sedation score of 6  ondansetron Injectable 4 milliGRAM(s) IV Push every 6 hours PRN Nausea and/or Vomiting 2nd line      LABS:                        10.3   3.84  )-----------( 315      ( 08 Apr 2019 11:01 )             34.3     04-08    141  |  109<H>  |  11  ----------------------------<  93  3.9   |  20<L>  |  0.83    Ca    9.1      08 Apr 2019 11:01    TPro  7.7  /  Alb  4.4  /  TBili  0.2  /  DBili  x   /  AST  16  /  ALT  10  /  AlkPhos  50  04-08    PT/INR - ( 08 Apr 2019 11:01 )   PT: 11.4 sec;   INR: 1.01          PTT - ( 08 Apr 2019 11:01 )  PTT:31.5 sec
GYN POC   Patient seen at bedside.  Pain controlled. Tolerating sips.  No OOB yet.  Patel in place.    Denies CP, palpitations, SOB, fever, chills, nausea, vomiting.    Vital Signs Last 24 Hrs  T(C): 36.7 (08 Apr 2019 16:44), Max: 36.7 (08 Apr 2019 10:21)  T(F): 98 (08 Apr 2019 16:44), Max: 98.1 (08 Apr 2019 14:06)  HR: 77 (08 Apr 2019 16:44) (68 - 78)  BP: 113/70 (08 Apr 2019 16:44) (102/55 - 113/70)  BP(mean): 76 (08 Apr 2019 15:36) (74 - 77)  RR: 18 (08 Apr 2019 16:44) (12 - 25)  SpO2: 100% (08 Apr 2019 16:44) (100% - 100%)    Physical Exam:  Gen: No Acute Distress  Pulm: Clear to auscultation bilaterally  GI: soft, appropriately tender, mildly distended, decreased BS, no rebound, no guarding.  Dressing C/D/I  : Patel in place  Ext: SCDs in place, wnl    I&O's Summary    08 Apr 2019 07:01  -  08 Apr 2019 19:33  --------------------------------------------------------  IN: 300 mL / OUT: 340 mL / NET: -40 mL      MEDICATIONS  (STANDING):  acetaminophen   Tablet .. 1000 milliGRAM(s) Oral every 8 hours  docusate sodium 100 milliGRAM(s) Oral three times a day  enoxaparin Injectable 40 milliGRAM(s) SubCutaneous every 24 hours  HYDROmorphone PCA (1 mG/mL) 30 milliLiter(s) PCA Continuous PCA Continuous  ketorolac   Injectable 30 milliGRAM(s) IV Push every 6 hours  lactated ringers. 1000 milliLiter(s) (100 mL/Hr) IV Continuous <Continuous>  simethicone 80 milliGRAM(s) Chew every 8 hours    MEDICATIONS  (PRN):  HYDROmorphone  Injectable 0.5 milliGRAM(s) IV Push every 10 minutes PRN Severe Pain (7 - 10)  metoclopramide Injectable 10 milliGRAM(s) IV Push every 6 hours PRN Nausea and/or Vomiting  naloxone Injectable 0.1 milliGRAM(s) IV Push every 3 minutes PRN For ANY of the following changes in patient status:  A. RR LESS THAN 10 breaths per minute, B. Oxygen saturation LESS THAN 90%, C. Sedation score of 6  ondansetron Injectable 4 milliGRAM(s) IV Push every 6 hours PRN Nausea and/or Vomiting 2nd line    Allergies    No Known Allergies    Intolerances        LABS:                        10.3   3.84  )-----------( 315      ( 08 Apr 2019 11:01 )             34.3     04-08    141  |  109<H>  |  11  ----------------------------<  93  3.9   |  20<L>  |  0.83    Ca    9.1      08 Apr 2019 11:01    TPro  7.7  /  Alb  4.4  /  TBili  0.2  /  DBili  x   /  AST  16  /  ALT  10  /  AlkPhos  50  04-08    PT/INR - ( 08 Apr 2019 11:01 )   PT: 11.4 sec;   INR: 1.01          PTT - ( 08 Apr 2019 11:01 )  PTT:31.5 sec
GYN PROGRESS NOTE PGY4    Patient evaluated at the bedside. No acute events.  Denies CP/SOB/dizziness/nausea/vomiting/abdominal pain/calf pain.  Pain well controlled on oral pain medications. Ambulating. Passing flatus. Tolerating regular diet.  meeting all postop milestones.   Hb low but no sx of anemia.       GEN: patient appears well  LUNGS: no respiratory distress  ABD: soft, non tender, non distended, incision c/d/i steri strips  EXT: no calf tenderness        04-09 @ 07:01  -  04-10 @ 07:00  --------------------------------------------------------  IN: 900 mL / OUT: 2300 mL / NET: -1400 mL    04-10 @ 07:01  -  04-10 @ 14:39  --------------------------------------------------------  IN: 360 mL / OUT: 300 mL / NET: 60 mL
GYN Progress Note    Patient seen at bedside.  Pain controlled on OPMS and toradol.  Tolerating regular diet. Passing BM.  OOB  Voiding    Denies CP, palpitations, SOB, fever, chills, nausea, vomiting.    Vital Signs Last 24 Hrs  T(C): 36.1 (09 Apr 2019 08:20), Max: 37.2 (09 Apr 2019 05:43)  T(F): 96.9 (09 Apr 2019 08:20), Max: 99 (09 Apr 2019 05:43)  HR: 77 (09 Apr 2019 08:20) (70 - 78)  BP: 101/54 (09 Apr 2019 08:20) (91/56 - 113/70)  BP(mean): 76 (08 Apr 2019 15:36) (76 - 76)  RR: 16 (09 Apr 2019 08:20) (15 - 18)  SpO2: 97% (09 Apr 2019 08:20) (97% - 100%)    Physical Exam:  Gen: No Acute Distress  Pulm: Normal work of breathing  GI: soft, appropriately tender, nondistended, +BS, no rebound, no guarding.  Incision C/D/I  Ext: SCDs in place, [facility]    I&O's Summary    08 Apr 2019 07:01  -  09 Apr 2019 07:00  --------------------------------------------------------  IN: 1880 mL / OUT: 1740 mL / NET: 140 mL    09 Apr 2019 07:01  -  09 Apr 2019 15:14  --------------------------------------------------------  IN: 420 mL / OUT: 1050 mL / NET: -630 mL      MEDICATIONS  (STANDING):  acetaminophen   Tablet .. 1000 milliGRAM(s) Oral every 8 hours  docusate sodium 100 milliGRAM(s) Oral three times a day  enoxaparin Injectable 40 milliGRAM(s) SubCutaneous every 24 hours  ketorolac   Injectable 30 milliGRAM(s) IV Push every 6 hours  simethicone 80 milliGRAM(s) Chew every 8 hours    MEDICATIONS  (PRN):  benzocaine 15 mG/menthol 3.6 mG Lozenge 1 Lozenge Oral every 4 hours PRN Sore Throat  metoclopramide Injectable 10 milliGRAM(s) IV Push every 6 hours PRN Nausea and/or Vomiting  naloxone Injectable 0.1 milliGRAM(s) IV Push every 3 minutes PRN For ANY of the following changes in patient status:  A. RR LESS THAN 10 breaths per minute, B. Oxygen saturation LESS THAN 90%, C. Sedation score of 6  ondansetron Injectable 4 milliGRAM(s) IV Push every 6 hours PRN Nausea and/or Vomiting 2nd line  oxyCODONE    IR 5 milliGRAM(s) Oral every 4 hours PRN Moderate Pain (4 - 6)  oxyCODONE    IR 10 milliGRAM(s) Oral every 6 hours PRN Severe Pain (7 - 10)      LABS:                        7.9    10.20 )-----------( 239      ( 09 Apr 2019 07:01 )             26.9     04-09    136  |  106  |  7   ----------------------------<  128<H>  3.9   |  22  |  0.68    Ca    8.3<L>      09 Apr 2019 07:01    TPro  7.7  /  Alb  4.4  /  TBili  0.2  /  DBili  x   /  AST  16  /  ALT  10  /  AlkPhos  50  04-08    PT/INR - ( 08 Apr 2019 11:01 )   PT: 11.4 sec;   INR: 1.01          PTT - ( 08 Apr 2019 11:01 )  PTT:31.5 sec
INTERVAL HPI/OVERNIGHT EVENTS: Pt seen and examined at bedside.  Ms. Gonzales is without complaints.    She denies nausea or vomiting.  She is tolerating regular diet, ambulating and voiding without difficulty     MEDICATIONS  (STANDING):  acetaminophen   Tablet .. 1000 milliGRAM(s) Oral every 8 hours  docusate sodium 100 milliGRAM(s) Oral three times a day  enoxaparin Injectable 40 milliGRAM(s) SubCutaneous every 24 hours  ketorolac   Injectable 30 milliGRAM(s) IV Push every 6 hours  simethicone 80 milliGRAM(s) Chew every 8 hours    MEDICATIONS  (PRN):  benzocaine 15 mG/menthol 3.6 mG Lozenge 1 Lozenge Oral every 4 hours PRN Sore Throat  metoclopramide Injectable 10 milliGRAM(s) IV Push every 6 hours PRN Nausea and/or Vomiting  naloxone Injectable 0.1 milliGRAM(s) IV Push every 3 minutes PRN For ANY of the following changes in patient status:  A. RR LESS THAN 10 breaths per minute, B. Oxygen saturation LESS THAN 90%, C. Sedation score of 6  ondansetron Injectable 4 milliGRAM(s) IV Push every 6 hours PRN Nausea and/or Vomiting 2nd line  oxyCODONE    IR 5 milliGRAM(s) Oral every 4 hours PRN Moderate Pain (4 - 6)  oxyCODONE    IR 10 milliGRAM(s) Oral every 6 hours PRN Severe Pain (7 - 10)      Allergies    No Known Allergies    Intolerances        12 point ROS negative except as outlined above    Vital Signs Last 24 Hrs  T(C): 36.2 (09 Apr 2019 15:27), Max: 37.2 (09 Apr 2019 05:43)  T(F): 97.1 (09 Apr 2019 15:27), Max: 99 (09 Apr 2019 05:43)  HR: 99 (09 Apr 2019 15:27) (71 - 99)  BP: 107/58 (09 Apr 2019 15:27) (91/56 - 107/58)  BP(mean): --  RR: 16 (09 Apr 2019 15:27) (16 - 17)  SpO2: 100% (09 Apr 2019 15:27) (97% - 100%)    Last Menstrual Period      PHYSICAL EXAM:    GA: NAD, A+0 x 3  CV: RRR  Pulm: CTA BL  Breasts: soft, nontender, no palpable masses  Abd: ( + ) BS, soft, nontender, nondistended, no rebound or guarding,   Incision: clean, dry and intact; steri-strips in place  Uterus: Nontender.    : lochia WNL  Extremities: no swelling or calf tenderness, reflexes +2 bilaterally      LABS:                        7.9    10.20 )-----------( 239      ( 09 Apr 2019 07:01 )             26.9     04-09    136  |  106  |  7   ----------------------------<  128<H>  3.9   |  22  |  0.68    Ca    8.3<L>      09 Apr 2019 07:01    TPro  7.7  /  Alb  4.4  /  TBili  0.2  /  DBili  x   /  AST  16  /  ALT  10  /  AlkPhos  50  04-08    PT/INR - ( 08 Apr 2019 11:01 )   PT: 11.4 sec;   INR: 1.01          PTT - ( 08 Apr 2019 11:01 )  PTT:31.5 sec      RADIOLOGY & ADDITIONAL TESTS:

## 2019-04-10 NOTE — DISCHARGE NOTE PROVIDER - NSDCACTIVITY_GEN_ALL_CORE
No restrictions/Showering allowed/Walking - Outdoors allowed/No heavy lifting/straining/Stairs allowed/Walking - Indoors allowed

## 2019-04-10 NOTE — DISCHARGE NOTE NURSING/CASE MANAGEMENT/SOCIAL WORK - NSDCDPATPORTLINK_GEN_ALL_CORE
You can access the [facility] Portal, offered by [facility], by registering with the following website: [email or web address]

## 2019-04-11 PROBLEM — D21.9 BENIGN NEOPLASM OF CONNECTIVE AND OTHER SOFT TISSUE, UNSPECIFIED: Chronic | Status: ACTIVE | Noted: 2019-04-05

## 2019-04-11 PROCEDURE — 36415 COLL VENOUS BLD VENIPUNCTURE: CPT

## 2019-04-11 PROCEDURE — 86900 BLOOD TYPING SEROLOGIC ABO: CPT

## 2019-04-11 PROCEDURE — 86901 BLOOD TYPING SEROLOGIC RH(D): CPT

## 2019-04-11 PROCEDURE — 85730 THROMBOPLASTIN TIME PARTIAL: CPT

## 2019-04-11 PROCEDURE — 86850 RBC ANTIBODY SCREEN: CPT

## 2019-04-11 PROCEDURE — 80048 BASIC METABOLIC PNL TOTAL CA: CPT

## 2019-04-11 PROCEDURE — 85025 COMPLETE CBC W/AUTO DIFF WBC: CPT

## 2019-04-11 PROCEDURE — 85610 PROTHROMBIN TIME: CPT

## 2019-04-11 PROCEDURE — 88305 TISSUE EXAM BY PATHOLOGIST: CPT

## 2019-04-11 PROCEDURE — 80053 COMPREHEN METABOLIC PANEL: CPT

## 2019-04-12 LAB — SURGICAL PATHOLOGY STUDY: SIGNIFICANT CHANGE UP

## 2019-04-17 ENCOUNTER — APPOINTMENT (OUTPATIENT)
Dept: OBGYN | Facility: CLINIC | Age: 39
End: 2019-04-17
Payer: COMMERCIAL

## 2019-04-17 ENCOUNTER — LABORATORY RESULT (OUTPATIENT)
Age: 39
End: 2019-04-17

## 2019-04-17 DIAGNOSIS — R30.0 DYSURIA: ICD-10-CM

## 2019-04-17 DIAGNOSIS — Z87.440 PERSONAL HISTORY OF URINARY (TRACT) INFECTIONS: ICD-10-CM

## 2019-04-17 PROCEDURE — 99024 POSTOP FOLLOW-UP VISIT: CPT

## 2019-04-17 RX ORDER — CIPROFLOXACIN HYDROCHLORIDE 500 MG/1
500 TABLET, FILM COATED ORAL
Qty: 6 | Refills: 0 | Status: ACTIVE | COMMUNITY
Start: 2019-04-17 | End: 1900-01-01

## 2019-04-17 NOTE — HISTORY OF PRESENT ILLNESS
[0/10] : no pain reported [Fever] : no fever [Chills] : no chills [Nausea] : no nausea [Vomiting] : no vomiting [Clean/Dry/Intact] : clean, dry and intact [Erythema] : not erythematous [Dehiscence] : not dehisced [None] : no vaginal bleeding [Doing Well] : is doing well [Excellent Pain Control] : has excellent pain control [No Sign of Infection] : is showing no signs of infection [No DeForest] : to avoid sexual intercourse [No Tampons/Suppositories] : against using tampons or vaginal suppositories [Limited ADLs] : to participate in activities of daily living with limitations [No Work] : not to work [Limited Housework] : to do housework with limitations [No Sports] : not to participate in sports [de-identified] : Ms. Gonzales is s/p abdominal myomectomy that required urinary bladder catheterization.  She c/o dysuria.  Denies fever.

## 2019-05-14 ENCOUNTER — APPOINTMENT (OUTPATIENT)
Dept: OBGYN | Facility: CLINIC | Age: 39
End: 2019-05-14
Payer: COMMERCIAL

## 2019-05-14 VITALS
BODY MASS INDEX: 26.33 KG/M2 | WEIGHT: 158 LBS | DIASTOLIC BLOOD PRESSURE: 62 MMHG | SYSTOLIC BLOOD PRESSURE: 110 MMHG | HEIGHT: 65 IN

## 2019-05-14 DIAGNOSIS — N39.0 INFECTION AND INFLAMMATORY REACTION DUE TO INDWELLING URETHRAL CATHETER, INITIAL ENCOUNTER: ICD-10-CM

## 2019-05-14 DIAGNOSIS — D25.9 LEIOMYOMA OF UTERUS, UNSPECIFIED: ICD-10-CM

## 2019-05-14 DIAGNOSIS — Z48.89 ENCOUNTER FOR OTHER SPECIFIED SURGICAL AFTERCARE: ICD-10-CM

## 2019-05-14 DIAGNOSIS — T83.511A INFECTION AND INFLAMMATORY REACTION DUE TO INDWELLING URETHRAL CATHETER, INITIAL ENCOUNTER: ICD-10-CM

## 2019-05-14 PROCEDURE — 81002 URINALYSIS NONAUTO W/O SCOPE: CPT

## 2019-05-14 PROCEDURE — 99024 POSTOP FOLLOW-UP VISIT: CPT

## 2019-05-14 RX ORDER — AMPICILLIN 500 MG/1
500 CAPSULE ORAL 4 TIMES DAILY
Qty: 28 | Refills: 0 | Status: ACTIVE | COMMUNITY
Start: 2019-05-14 | End: 1900-01-01

## 2019-05-14 NOTE — PHYSICAL EXAM
[Labia Majora] : labia major [Labia Minora] : labia minora [Normal] : clitoris [No Bleeding] : there was no active vaginal bleeding [Enlarged ___ wks] : enlarged [unfilled] ~Uweeks [Uterine Adnexae] : were not tender and not enlarged [Motion Tenderness] : there was no cervical motion tenderness

## 2019-05-28 ENCOUNTER — APPOINTMENT (OUTPATIENT)
Dept: OBGYN | Facility: CLINIC | Age: 39
End: 2019-05-28
Payer: COMMERCIAL

## 2019-05-28 PROCEDURE — 99024 POSTOP FOLLOW-UP VISIT: CPT

## 2019-06-03 LAB
APPEARANCE: ABNORMAL
BILIRUBIN URINE: NEGATIVE
BLOOD URINE: ABNORMAL
COLOR: YELLOW
GLUCOSE QUALITATIVE U: NEGATIVE
KETONES URINE: NEGATIVE
LEUKOCYTE ESTERASE URINE: NEGATIVE
NITRITE URINE: NEGATIVE
PH URINE: 6.5
PROTEIN URINE: ABNORMAL
SPECIFIC GRAVITY URINE: 1.04
UROBILINOGEN URINE: NORMAL

## 2019-11-18 RX ORDER — TINIDAZOLE 500 MG/1
500 TABLET, FILM COATED ORAL DAILY
Qty: 10 | Refills: 0 | Status: ACTIVE | COMMUNITY
Start: 2019-10-18 | End: 1900-01-01

## 2019-12-08 NOTE — PATIENT PROFILE ADULT - DO YOU FEEL LIKE HURTING YOURSELF OR OTHERS?
Can continue sudafed for next 48 hours. Try and stretch either to every 6-8 hours. Then discontinue if possible.   Flonase 1 spray in each nostril twice daily  Acetaminophen/ibuprofen per package directions  Keep appt with audiology  If symptoms fail to improve- follow up with ENT.    no

## 2020-02-05 ENCOUNTER — APPOINTMENT (OUTPATIENT)
Dept: OBGYN | Facility: CLINIC | Age: 40
End: 2020-02-05
Payer: COMMERCIAL

## 2020-02-05 ENCOUNTER — LABORATORY RESULT (OUTPATIENT)
Age: 40
End: 2020-02-05

## 2020-02-05 VITALS
BODY MASS INDEX: 25.99 KG/M2 | SYSTOLIC BLOOD PRESSURE: 110 MMHG | DIASTOLIC BLOOD PRESSURE: 70 MMHG | HEIGHT: 65 IN | WEIGHT: 156 LBS

## 2020-02-05 DIAGNOSIS — Z01.419 ENCOUNTER FOR GYNECOLOGICAL EXAMINATION (GENERAL) (ROUTINE) W/OUT ABNORMAL FINDINGS: ICD-10-CM

## 2020-02-05 DIAGNOSIS — Z12.4 ENCOUNTER FOR SCREENING FOR MALIGNANT NEOPLASM OF CERVIX: ICD-10-CM

## 2020-02-05 DIAGNOSIS — N92.0 EXCESSIVE AND FREQUENT MENSTRUATION WITH REGULAR CYCLE: ICD-10-CM

## 2020-02-05 PROCEDURE — 99395 PREV VISIT EST AGE 18-39: CPT

## 2020-02-05 RX ORDER — CLINDAMYCIN PHOSPHATE 100 MG/5G
2 CREAM VAGINAL DAILY
Qty: 1 | Refills: 5 | Status: ACTIVE | COMMUNITY
Start: 2020-02-05 | End: 1900-01-01

## 2020-02-05 NOTE — HISTORY OF PRESENT ILLNESS
[Definite:  ___ (Date)] : the last menstrual period was [unfilled] [Normal Amount/Duration] : was of a normal amount and duration [Regular Cycle Intervals] : periods have been regular [Frequency: Q ___ days] : menstrual periods occur approximately every [unfilled] days [Menstrual Cramps] : menstrual cramps [Monogamous] : is monogamous [Sexually Active] : is sexually active [Male ___] : [unfilled] male [Spotting Between  Menses] : no spotting between menses [On BCP at conception] : the patient was not on BCP at conception [Contraception] : does not use contraception

## 2020-02-19 LAB — CYTOLOGY CVX/VAG DOC THIN PREP: ABNORMAL

## 2020-06-01 ENCOUNTER — RX RENEWAL (OUTPATIENT)
Age: 40
End: 2020-06-01

## 2020-10-20 RX ORDER — CLINDAMYCIN PHOSPHATE 20 MG/G
2 CREAM VAGINAL DAILY
Qty: 1 | Refills: 0 | Status: ACTIVE | COMMUNITY
Start: 2020-10-20 | End: 1900-01-01

## 2020-11-16 NOTE — PROGRESS NOTE ADULT - ASSESSMENT
A/P 37y PPD#2 s/p , stable  1. Pain: well controlled on OPM  2. GI: Regular diet  3. : voiding  4. DVT prophylaxis: ambulate  5. Dispo: PPD 2, unless otherwise specified complains of pain/discomfort

## 2020-11-17 NOTE — PATIENT PROFILE OB - PATIENT REPRESENTATIVE PHONE
DANYELLE Katz. . Please verify fax umber.
Keshav Rabago verified fax # [phone].  Resent fax
Please Kamajlit Cruz
Will call and get faxed
[phone]

## 2020-12-21 PROBLEM — Z87.440 HISTORY OF URINARY TRACT INFECTION: Status: RESOLVED | Noted: 2019-04-17 | Resolved: 2020-12-21

## 2021-05-19 DIAGNOSIS — B37.3 CANDIDIASIS OF VULVA AND VAGINA: ICD-10-CM

## 2021-05-19 RX ORDER — TINIDAZOLE 500 MG/1
500 TABLET, FILM COATED ORAL TWICE DAILY
Qty: 10 | Refills: 1 | Status: ACTIVE | COMMUNITY
Start: 2021-05-19 | End: 1900-01-01

## 2021-07-06 ENCOUNTER — APPOINTMENT (OUTPATIENT)
Dept: OBGYN | Facility: CLINIC | Age: 41
End: 2021-07-06

## 2021-09-29 RX ORDER — TRANEXAMIC ACID 650 MG/1
650 TABLET ORAL
Qty: 1 | Refills: 1 | Status: ACTIVE | COMMUNITY
Start: 2020-02-05 | End: 1900-01-01

## 2022-05-23 NOTE — PATIENT PROFILE OB - AMNIOTIC FLUID COLOR, LABOR
Rx Refill Note  Requested Prescriptions     Pending Prescriptions Disp Refills    cyclobenzaprine (FLEXERIL) 10 MG tablet [Pharmacy Med Name: CYCLOBENZAPRINE 10 MG TABLET] 45 tablet 0     Sig: TAKE 1 TABLET BY MOUTH THREE TIMES A DAY AS NEEDED FOR MUSCLE SPASM      Last office visit with prescribing clinician: 4/19/2022      Next office visit with prescribing clinician: girban  Last Filled:03.31.22      No diagnosis found. Tendinopathy of rotator cuff, right   Date of Surgery:      Jaclyn Vallejo MA  05/23/22, 07:48 EDT    {TIP  Encounters:    {TIP  Please add Last Relevant Lab Date if appropriate:  {TIP  Is Refill Pharmacy correct?:   clear

## 2023-05-30 ENCOUNTER — RX RENEWAL (OUTPATIENT)
Age: 43
End: 2023-05-30

## 2025-05-14 ENCOUNTER — NON-APPOINTMENT (OUTPATIENT)
Age: 45
End: 2025-05-14

## 2025-05-14 ENCOUNTER — APPOINTMENT (OUTPATIENT)
Dept: OBGYN | Facility: CLINIC | Age: 45
End: 2025-05-14
Payer: COMMERCIAL

## 2025-05-14 VITALS
HEART RATE: 86 BPM | SYSTOLIC BLOOD PRESSURE: 123 MMHG | WEIGHT: 145 LBS | BODY MASS INDEX: 24.13 KG/M2 | DIASTOLIC BLOOD PRESSURE: 84 MMHG | OXYGEN SATURATION: 100 %

## 2025-05-14 DIAGNOSIS — Z78.9 OTHER SPECIFIED HEALTH STATUS: ICD-10-CM

## 2025-05-14 DIAGNOSIS — Z84.1 FAMILY HISTORY OF DISORDERS OF KIDNEY AND URETER: ICD-10-CM

## 2025-05-14 DIAGNOSIS — D64.9 ANEMIA, UNSPECIFIED: ICD-10-CM

## 2025-05-14 DIAGNOSIS — F41.9 ANXIETY DISORDER, UNSPECIFIED: ICD-10-CM

## 2025-05-14 DIAGNOSIS — N93.9 ABNORMAL UTERINE AND VAGINAL BLEEDING, UNSPECIFIED: ICD-10-CM

## 2025-05-14 DIAGNOSIS — Z80.3 FAMILY HISTORY OF MALIGNANT NEOPLASM OF BREAST: ICD-10-CM

## 2025-05-14 PROCEDURE — 99203 OFFICE O/P NEW LOW 30 MIN: CPT

## 2025-05-14 RX ORDER — NAPROXEN 500 MG/1
500 TABLET ORAL
Refills: 0 | Status: ACTIVE | COMMUNITY

## 2025-05-14 RX ORDER — DROSPIRENONE 4 MG/1
TABLET, FILM COATED ORAL
Refills: 0 | Status: ACTIVE | COMMUNITY